# Patient Record
Sex: MALE | HISPANIC OR LATINO
[De-identification: names, ages, dates, MRNs, and addresses within clinical notes are randomized per-mention and may not be internally consistent; named-entity substitution may affect disease eponyms.]

---

## 2020-08-24 PROBLEM — Z00.00 ENCOUNTER FOR PREVENTIVE HEALTH EXAMINATION: Status: ACTIVE | Noted: 2020-08-24

## 2020-08-26 ENCOUNTER — APPOINTMENT (OUTPATIENT)
Dept: NEUROSURGERY | Facility: CLINIC | Age: 45
End: 2020-08-26
Payer: MEDICAID

## 2020-08-26 DIAGNOSIS — M54.5 LOW BACK PAIN: ICD-10-CM

## 2020-08-26 DIAGNOSIS — M48.061 SPINAL STENOSIS, LUMBAR REGION WITHOUT NEUROGENIC CLAUDICATION: ICD-10-CM

## 2020-08-26 DIAGNOSIS — I10 ESSENTIAL (PRIMARY) HYPERTENSION: ICD-10-CM

## 2020-08-26 DIAGNOSIS — E11.9 TYPE 2 DIABETES MELLITUS W/OUT COMPLICATIONS: ICD-10-CM

## 2020-08-26 PROCEDURE — 99213 OFFICE O/P EST LOW 20 MIN: CPT | Mod: 95

## 2020-08-26 RX ORDER — METFORMIN HYDROCHLORIDE 500 MG/1
500 TABLET, COATED ORAL
Refills: 0 | Status: ACTIVE | COMMUNITY

## 2020-08-26 RX ORDER — LOSARTAN POTASSIUM 50 MG/1
50 TABLET, FILM COATED ORAL
Refills: 0 | Status: ACTIVE | COMMUNITY

## 2020-08-26 RX ORDER — METOPROLOL TARTRATE 100 MG/1
100 TABLET, FILM COATED ORAL
Refills: 0 | Status: ACTIVE | COMMUNITY

## 2020-08-26 RX ORDER — OMEPRAZOLE 40 MG/1
40 CAPSULE, DELAYED RELEASE ORAL
Refills: 0 | Status: ACTIVE | COMMUNITY

## 2020-08-26 NOTE — HISTORY OF PRESENT ILLNESS
[de-identified] : 43 y/o male with hx of HTN and type II DM surghx L5-S1 (7/2018) presents today for telemedicine follow-up. 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 He states that he was then furlough as a results of the pandemic and develop back pain two months after being furlough. He followed up with pain management and was treated with medications with were gabapentin and flexeril and  oxycodone 5 mg  and he received one epidural  injection. The combination of pain treatment helped but states he is still taking the medication including the oxycodone three times a day. Currently he has lower back pain radiating to bilateral lower extremities in which he complains of soreness and aching. He denies any other neurological symptoms. [FreeTextEntry1] : low back pain

## 2020-08-26 NOTE — PLAN
[FreeTextEntry1] : 45 y/o male with hx of HTN and type II DM surg hx L5-S1 (7/2018) presents today for telemedicine follow-up of lower back pain. 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 He states he worked from November to April and then was furlough as a results of the pandemic. Two months after he was furlough he develop lower back pain radiating to bilateral lower extremities and was treated. The treatment included gabapentin, oxycodone, flexeril and one epidural injection. The treatment did help but he still requires oxycodone three times a day. Prior to his return back to work he was only on NSAIDs for pain controlled. At this time based on increase use of Narcoctics, failure of conservative management which include use of NSAIDs, PT and epidural injection and past surgical hx we recommend a new MRI of the lumbar spine and xrays to assess the surgical area. Once all testing is completed patient is to follow-up with the office. Plan of care and diagnostic testing was discussed in lenth and all questions and concerns has been addressed and answered. The patient provided verbal consent for a telemedicine consultation and I spent a total time of 15 minutes with the patient with more than half spent on counseling and coordinating care. \par \par \par

## 2020-08-26 NOTE — REASON FOR VISIT
[New Patient Visit] : a new patient visit [Referred By: _________] : Patient was referred by LESLIE [Home] : at home, [unfilled] , at the time of the visit. [Medical Office: ([facility])___] : at the medical office located in  [Verbal consent obtained from patient] : the patient, [unfilled] [FreeTextEntry1] : low back pain

## 2020-08-26 NOTE — REVIEW OF SYSTEMS
[Negative] : Heme/Lymph [Tingling] : no tingling [Numbness] : no numbness [Difficulty Walking] : no difficulty walking [Inability to Walk] : able to walk [FreeTextEntry9] : low back pain

## 2020-10-15 ENCOUNTER — APPOINTMENT (OUTPATIENT)
Dept: NEUROSURGERY | Facility: CLINIC | Age: 45
End: 2020-10-15

## 2022-07-07 ENCOUNTER — APPOINTMENT (OUTPATIENT)
Dept: NEUROSURGERY | Facility: CLINIC | Age: 47
End: 2022-07-07

## 2022-07-08 NOTE — ASSESSMENT
[FreeTextEntry1] : IMPRESSION:\par We discussed that at that time based on increase use of Narcotics, failure of conservative management which include use of NSAIDs, PT and epidural injection and past surgical histoy we recommended a new MRI of the lumbar spine and x-rays to assess the surgical area. \par \par \par \par PLAN:\par \par

## 2022-07-08 NOTE — HISTORY OF PRESENT ILLNESS
[FreeTextEntry1] : 45 y/o male with PMH of HTN and type II DM, surgical  hx L5-S1 (7/2018). Last visit via telemedicine on 8/26/2020 for follow-up of lower back pain. 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 Per patient, he worked from November to April and then was furlough as a results of the pandemic. Two months after he was furlough he develop lower back pain radiating to bilateral lower extremities and was treated. The treatment included Gabapentin, Oxycodone, Flexeril and one epidural injection. The treatment did help but he was still requiring oxycodone three times a day. Prior to his return back to work he was only on NSAIDs for pain controlled. \par \par We discussed that at that time based on increase use of Narcoctics, failure of conservative management which include use of NSAIDs, PT and epidural injection and past surgical hx we recommended a new MRI of the lumbar spine and x-rays to assess the surgical area. \par

## 2022-08-04 ENCOUNTER — APPOINTMENT (OUTPATIENT)
Dept: NEUROSURGERY | Facility: CLINIC | Age: 47
End: 2022-08-04

## 2024-07-08 DIAGNOSIS — Z87.891 PERSONAL HISTORY OF NICOTINE DEPENDENCE: ICD-10-CM

## 2024-07-08 DIAGNOSIS — Z78.9 OTHER SPECIFIED HEALTH STATUS: ICD-10-CM

## 2024-07-08 PROBLEM — M54.50 LOW BACK PAIN: Status: ACTIVE | Noted: 2020-08-26

## 2024-07-09 ENCOUNTER — APPOINTMENT (OUTPATIENT)
Dept: NEUROSURGERY | Facility: CLINIC | Age: 49
End: 2024-07-09
Payer: COMMERCIAL

## 2024-07-09 VITALS
HEIGHT: 68 IN | OXYGEN SATURATION: 96 % | WEIGHT: 200 LBS | TEMPERATURE: 97.2 F | DIASTOLIC BLOOD PRESSURE: 79 MMHG | BODY MASS INDEX: 30.31 KG/M2 | HEART RATE: 76 BPM | SYSTOLIC BLOOD PRESSURE: 121 MMHG

## 2024-07-09 DIAGNOSIS — M48.061 SPINAL STENOSIS, LUMBAR REGION WITHOUT NEUROGENIC CLAUDICATION: ICD-10-CM

## 2024-07-09 DIAGNOSIS — M54.50 LOW BACK PAIN, UNSPECIFIED: ICD-10-CM

## 2024-07-09 PROCEDURE — 99205 OFFICE O/P NEW HI 60 MIN: CPT

## 2024-07-09 RX ORDER — PREGABALIN 300 MG/1
300 CAPSULE ORAL
Refills: 0 | Status: ACTIVE | COMMUNITY

## 2024-07-09 RX ORDER — TIRZEPATIDE 2.5 MG/.5ML
2.5 INJECTION, SOLUTION SUBCUTANEOUS
Refills: 0 | Status: ACTIVE | COMMUNITY

## 2024-07-09 RX ORDER — HYDROCODONE BITARTRATE AND ACETAMINOPHEN 5; 325 MG/1; MG/1
5-325 TABLET ORAL
Refills: 0 | Status: ACTIVE | COMMUNITY

## 2024-07-09 RX ORDER — PANTOPRAZOLE 40 MG/1
40 TABLET, DELAYED RELEASE ORAL
Refills: 0 | Status: ACTIVE | COMMUNITY

## 2024-07-09 RX ORDER — ATORVASTATIN CALCIUM 20 MG/1
20 TABLET, FILM COATED ORAL
Refills: 0 | Status: ACTIVE | COMMUNITY

## 2024-07-09 RX ORDER — LOSARTAN POTASSIUM 100 MG/1
100 TABLET, FILM COATED ORAL
Refills: 0 | Status: ACTIVE | COMMUNITY

## 2024-07-09 RX ORDER — EMPAGLIFLOZIN AND METFORMIN HYDROCHLORIDE 12.5; 1 MG/1; MG/1
TABLET ORAL
Refills: 0 | Status: ACTIVE | COMMUNITY

## 2024-07-09 RX ORDER — CYCLOBENZAPRINE HYDROCHLORIDE 7.5 MG/1
TABLET, FILM COATED ORAL
Refills: 0 | Status: ACTIVE | COMMUNITY

## 2024-07-15 ENCOUNTER — NON-APPOINTMENT (OUTPATIENT)
Age: 49
End: 2024-07-15

## 2024-07-17 ENCOUNTER — APPOINTMENT (OUTPATIENT)
Dept: CT IMAGING | Facility: CLINIC | Age: 49
End: 2024-07-17

## 2024-07-17 PROCEDURE — 72131 CT LUMBAR SPINE W/O DYE: CPT

## 2024-08-02 ENCOUNTER — APPOINTMENT (OUTPATIENT)
Dept: RADIOLOGY | Facility: CLINIC | Age: 49
End: 2024-08-02
Payer: COMMERCIAL

## 2024-08-02 PROCEDURE — 72110 X-RAY EXAM L-2 SPINE 4/>VWS: CPT

## 2024-08-06 ENCOUNTER — NON-APPOINTMENT (OUTPATIENT)
Age: 49
End: 2024-08-06

## 2024-08-09 ENCOUNTER — OUTPATIENT (OUTPATIENT)
Dept: OUTPATIENT SERVICES | Facility: HOSPITAL | Age: 49
LOS: 1 days | End: 2024-08-09
Payer: COMMERCIAL

## 2024-08-09 VITALS
HEART RATE: 72 BPM | OXYGEN SATURATION: 98 % | SYSTOLIC BLOOD PRESSURE: 130 MMHG | RESPIRATION RATE: 16 BRPM | DIASTOLIC BLOOD PRESSURE: 83 MMHG | TEMPERATURE: 98 F | WEIGHT: 199.96 LBS | HEIGHT: 66 IN

## 2024-08-09 DIAGNOSIS — E11.9 TYPE 2 DIABETES MELLITUS WITHOUT COMPLICATIONS: ICD-10-CM

## 2024-08-09 DIAGNOSIS — M48.061 SPINAL STENOSIS, LUMBAR REGION WITHOUT NEUROGENIC CLAUDICATION: ICD-10-CM

## 2024-08-09 DIAGNOSIS — I10 ESSENTIAL (PRIMARY) HYPERTENSION: ICD-10-CM

## 2024-08-09 DIAGNOSIS — Z01.818 ENCOUNTER FOR OTHER PREPROCEDURAL EXAMINATION: ICD-10-CM

## 2024-08-09 DIAGNOSIS — M54.50 LOW BACK PAIN, UNSPECIFIED: ICD-10-CM

## 2024-08-09 DIAGNOSIS — Z98.1 ARTHRODESIS STATUS: Chronic | ICD-10-CM

## 2024-08-09 DIAGNOSIS — Z98.890 OTHER SPECIFIED POSTPROCEDURAL STATES: Chronic | ICD-10-CM

## 2024-08-09 LAB
A1C WITH ESTIMATED AVERAGE GLUCOSE RESULT: 6.8 % — HIGH (ref 4–5.6)
BLD GP AB SCN SERPL QL: SIGNIFICANT CHANGE UP
ESTIMATED AVERAGE GLUCOSE: 148 MG/DL — HIGH (ref 68–114)
MRSA PCR RESULT.: SIGNIFICANT CHANGE UP
S AUREUS DNA NOSE QL NAA+PROBE: SIGNIFICANT CHANGE UP

## 2024-08-09 NOTE — H&P PST ADULT - NSICDXPASTSURGICALHX_GEN_ALL_CORE_FT
PAST SURGICAL HISTORY:  History of lumbar surgery      PAST SURGICAL HISTORY:  H/O spinal fusion     History of lumbar surgery

## 2024-08-09 NOTE — H&P PST ADULT - ATTENDING COMMENTS
No change in neurologic exam. Right EHL weakness. We will proceed as planned. L4-5 Lumbar microdiscectomy Right.

## 2024-08-09 NOTE — H&P PST ADULT - NSICDXPASTMEDICALHX_GEN_ALL_CORE_FT
PAST MEDICAL HISTORY:  DM (diabetes mellitus)     GERD (gastroesophageal reflux disease)     HLD (hyperlipidemia)     HTN (hypertension)      PAST MEDICAL HISTORY:  DM (diabetes mellitus)     GERD (gastroesophageal reflux disease)     H/O spinal stenosis     HLD (hyperlipidemia)     HTN (hypertension)

## 2024-08-09 NOTE — H&P PST ADULT - PROBLEM SELECTOR PLAN 3
Pt instructed to stop Synjardy 3 days prior to surgery  Pt also instructed to stop Mounjaro at least 7 days prior to surgery- Per pt his last dose 8/5/24   Monitor glucose closely perioperatively

## 2024-08-09 NOTE — H&P PST ADULT - HISTORY OF PRESENT ILLNESS
48 y.o male with PMHx for HLD, HTN, GERD, DM, Lumbar fusion L5-S1 2018, now with c/o of worsening Low back pain. On w/u found to have Spinal Stenosis Lumbar region and now for schedule Right L4-L5 Lumbar Microdiscectomy on 8/21/24 with Dr Gan  48 y.o male with PMHx for HLD, HTN, GERD, DM, Spinal Stenosis s/p Lumbar fusion L5-S1 2018, now with c/o of worsening Low back pain. On w/u found to have Spinal Stenosis Lumbar region and now for schedule Right L4-L5 Lumbar Microdiscectomy on 8/21/24 with Dr Gan

## 2024-08-09 NOTE — H&P PST ADULT - NSICDXPROCEDURE_GEN_ALL_CORE_FT
PROCEDURES:  Laminotomy of lumbar vertebra with nerve root decompression 09-Aug-2024 09:27:35  Bee Turcios

## 2024-08-09 NOTE — H&P PST ADULT - ASSESSMENT
48 y.o male with c/o of worsening Low back pain. On w/u found to have Spinal Stenosis Lumbar region and now for schedule Right L4-L5 Lumbar Microdiscectomy on 8/21/24 with Dr Gan      48 y.o male with  Spinal Stenosis Lumbar region and now for schedule Right L4-L5 Lumbar Microdiscectomy on 8/21/24 with Dr Elvia CABALLERO 3

## 2024-08-09 NOTE — H&P PST ADULT - PROBLEM SELECTOR PLAN 1
Pt schedule for     Labs drawn by PCP - will f/u result  Pt was seen by PCP for Medical clearance - will f/u report    Pt was  instructed to stop aspirin/ecotrin and all over the counter medication including vitamins and herbal supplements one week prior to surgery   Instructions given on the use of 4% chlorhexidine wash and Pt verbalized understanding of same   Pt Instructed to have nothing by mouth starting midnight day before surgery  Patient is to expect a phone call day before surgery between the hours of 430- 630pm giving arrival time for surgery   Written and verbal preoperative instructions given to patient with understanding verbalized.     Patient today with STOP bang score 3  Low  risk for PADMINI Pt schedule for Right L4-L5 Lumbar Microdiscectomy on 8/21/24 with Dr Gan     Labs drawn by PCP - will f/u result  Pt was seen by PCP for Medical clearance - will f/u report    pt was swabbed for MRSA/MSSA -will f/u with result   Pt Instructed on use of Mupirocin if nasal swab positive      Pt was  instructed to stop aspirin/ecotrin and all over the counter medication including vitamins and herbal supplements one week prior to surgery   Instructions given on the use of 4% chlorhexidine wash and Pt verbalized understanding of same   Pt Instructed to have nothing by mouth starting midnight day before surgery  Patient is to expect a phone call day before surgery between the hours of 430- 630pm giving arrival time for surgery   Written and verbal preoperative instructions given to patient with understanding verbalized.     Patient today with STOP bang score 3  Low  risk for PADMINI

## 2024-08-12 PROCEDURE — 86901 BLOOD TYPING SEROLOGIC RH(D): CPT

## 2024-08-12 PROCEDURE — 86900 BLOOD TYPING SEROLOGIC ABO: CPT

## 2024-08-12 PROCEDURE — 87640 STAPH A DNA AMP PROBE: CPT

## 2024-08-12 PROCEDURE — 86850 RBC ANTIBODY SCREEN: CPT

## 2024-08-12 PROCEDURE — 36415 COLL VENOUS BLD VENIPUNCTURE: CPT

## 2024-08-12 PROCEDURE — G0463: CPT

## 2024-08-12 PROCEDURE — 83036 HEMOGLOBIN GLYCOSYLATED A1C: CPT

## 2024-08-12 PROCEDURE — 87641 MR-STAPH DNA AMP PROBE: CPT

## 2024-08-21 ENCOUNTER — OUTPATIENT (OUTPATIENT)
Dept: OUTPATIENT SERVICES | Facility: HOSPITAL | Age: 49
LOS: 1 days | End: 2024-08-21
Payer: COMMERCIAL

## 2024-08-21 ENCOUNTER — TRANSCRIPTION ENCOUNTER (OUTPATIENT)
Age: 49
End: 2024-08-21

## 2024-08-21 ENCOUNTER — APPOINTMENT (OUTPATIENT)
Dept: NEUROSURGERY | Facility: HOSPITAL | Age: 49
End: 2024-08-21

## 2024-08-21 VITALS
SYSTOLIC BLOOD PRESSURE: 139 MMHG | DIASTOLIC BLOOD PRESSURE: 83 MMHG | HEART RATE: 73 BPM | TEMPERATURE: 98 F | WEIGHT: 199.96 LBS | OXYGEN SATURATION: 99 % | HEIGHT: 66 IN | RESPIRATION RATE: 16 BRPM

## 2024-08-21 VITALS — DIASTOLIC BLOOD PRESSURE: 67 MMHG | HEART RATE: 94 BPM | OXYGEN SATURATION: 95 % | SYSTOLIC BLOOD PRESSURE: 120 MMHG

## 2024-08-21 DIAGNOSIS — M48.061 SPINAL STENOSIS, LUMBAR REGION WITHOUT NEUROGENIC CLAUDICATION: ICD-10-CM

## 2024-08-21 DIAGNOSIS — M54.50 LOW BACK PAIN, UNSPECIFIED: ICD-10-CM

## 2024-08-21 DIAGNOSIS — Z98.890 OTHER SPECIFIED POSTPROCEDURAL STATES: Chronic | ICD-10-CM

## 2024-08-21 DIAGNOSIS — Z98.1 ARTHRODESIS STATUS: Chronic | ICD-10-CM

## 2024-08-21 PROBLEM — Z87.39 PERSONAL HISTORY OF OTHER DISEASES OF THE MUSCULOSKELETAL SYSTEM AND CONNECTIVE TISSUE: Chronic | Status: ACTIVE | Noted: 2024-08-09

## 2024-08-21 PROBLEM — K21.9 GASTRO-ESOPHAGEAL REFLUX DISEASE WITHOUT ESOPHAGITIS: Chronic | Status: ACTIVE | Noted: 2024-08-09

## 2024-08-21 PROBLEM — E11.9 TYPE 2 DIABETES MELLITUS WITHOUT COMPLICATIONS: Chronic | Status: ACTIVE | Noted: 2024-08-09

## 2024-08-21 PROBLEM — I10 ESSENTIAL (PRIMARY) HYPERTENSION: Chronic | Status: ACTIVE | Noted: 2024-08-09

## 2024-08-21 PROBLEM — E78.5 HYPERLIPIDEMIA, UNSPECIFIED: Chronic | Status: ACTIVE | Noted: 2024-08-09

## 2024-08-21 LAB
BLD GP AB SCN SERPL QL: SIGNIFICANT CHANGE UP
GLUCOSE BLDC GLUCOMTR-MCNC: 190 MG/DL — HIGH (ref 70–99)
GLUCOSE BLDC GLUCOMTR-MCNC: 239 MG/DL — HIGH (ref 70–99)

## 2024-08-21 PROCEDURE — 76000 FLUOROSCOPY <1 HR PHYS/QHP: CPT

## 2024-08-21 PROCEDURE — 97162 PT EVAL MOD COMPLEX 30 MIN: CPT

## 2024-08-21 PROCEDURE — 86850 RBC ANTIBODY SCREEN: CPT

## 2024-08-21 PROCEDURE — 62380 NDSC DCMPRN 1 NTRSPC LUMBAR: CPT | Mod: AS

## 2024-08-21 PROCEDURE — 62380 NDSC DCMPRN 1 NTRSPC LUMBAR: CPT

## 2024-08-21 PROCEDURE — 86900 BLOOD TYPING SEROLOGIC ABO: CPT

## 2024-08-21 PROCEDURE — 82962 GLUCOSE BLOOD TEST: CPT

## 2024-08-21 PROCEDURE — 36415 COLL VENOUS BLD VENIPUNCTURE: CPT

## 2024-08-21 PROCEDURE — 86901 BLOOD TYPING SEROLOGIC RH(D): CPT

## 2024-08-21 PROCEDURE — 62380 NDSC DCMPRN 1 NTRSPC LUMBAR: CPT | Mod: RT

## 2024-08-21 DEVICE — SURGICEL 2 X 14": Type: IMPLANTABLE DEVICE | Status: FUNCTIONAL

## 2024-08-21 DEVICE — FLOSEAL WITH RECOTHROM THROMBIN 5ML: Type: IMPLANTABLE DEVICE | Status: FUNCTIONAL

## 2024-08-21 DEVICE — SURGIFOAM PAD 8CM X 12.5CM X 2MM (100C): Type: IMPLANTABLE DEVICE | Status: FUNCTIONAL

## 2024-08-21 DEVICE — BONE WAX 2.5GM: Type: IMPLANTABLE DEVICE | Status: FUNCTIONAL

## 2024-08-21 RX ORDER — CELECOXIB 400 MG/1
200 CAPSULE ORAL ONCE
Refills: 0 | Status: COMPLETED | OUTPATIENT
Start: 2024-08-21 | End: 2024-08-21

## 2024-08-21 RX ORDER — ATORVASTATIN CALCIUM 40 MG/1
1 TABLET, FILM COATED ORAL
Qty: 0 | Refills: 0 | DISCHARGE

## 2024-08-21 RX ORDER — SODIUM CHLORIDE 9 MG/ML
3 INJECTION INTRAMUSCULAR; INTRAVENOUS; SUBCUTANEOUS EVERY 8 HOURS
Refills: 0 | Status: DISCONTINUED | OUTPATIENT
Start: 2024-08-21 | End: 2024-08-21

## 2024-08-21 RX ORDER — HYDROMORPHONE HYDROCHLORIDE 2 MG/1
0.5 TABLET ORAL
Refills: 0 | Status: DISCONTINUED | OUTPATIENT
Start: 2024-08-21 | End: 2024-08-21

## 2024-08-21 RX ORDER — OXYCODONE HYDROCHLORIDE 5 MG/1
2.5 TABLET ORAL EVERY 4 HOURS
Refills: 0 | Status: DISCONTINUED | OUTPATIENT
Start: 2024-08-21 | End: 2024-08-21

## 2024-08-21 RX ORDER — ONDANSETRON 2 MG/ML
4 INJECTION, SOLUTION INTRAMUSCULAR; INTRAVENOUS EVERY 6 HOURS
Refills: 0 | Status: DISCONTINUED | OUTPATIENT
Start: 2024-08-21 | End: 2024-09-04

## 2024-08-21 RX ORDER — CYCLOBENZAPRINE HCL 10 MG
2 TABLET ORAL
Refills: 0 | DISCHARGE

## 2024-08-21 RX ORDER — METOPROLOL TARTRATE 100 MG
1 TABLET ORAL
Qty: 0 | Refills: 0 | DISCHARGE

## 2024-08-21 RX ORDER — EMPAGLIFLOZIN, METFORMIN HYDROCHLORIDE 5; 1000 MG/1; MG/1
1 TABLET, EXTENDED RELEASE ORAL
Qty: 0 | Refills: 0 | DISCHARGE

## 2024-08-21 RX ORDER — PANTOPRAZOLE SODIUM 20 MG/1
1 TABLET, DELAYED RELEASE ORAL
Qty: 0 | Refills: 0 | DISCHARGE

## 2024-08-21 RX ORDER — OXYCODONE AND ACETAMINOPHEN 10; 325 MG/1; MG/1
1 TABLET ORAL
Refills: 0 | DISCHARGE

## 2024-08-21 RX ORDER — METHYLPREDNISOLONE 4 MG
1 TABLET ORAL
Qty: 1 | Refills: 0
Start: 2024-08-21 | End: 2024-08-26

## 2024-08-21 RX ORDER — CYCLOBENZAPRINE HCL 10 MG
1 TABLET ORAL
Qty: 30 | Refills: 0
Start: 2024-08-21 | End: 2024-08-30

## 2024-08-21 RX ORDER — FENTANYL CITRATE 50 UG/ML
25 INJECTION INTRAMUSCULAR; INTRAVENOUS
Refills: 0 | Status: DISCONTINUED | OUTPATIENT
Start: 2024-08-21 | End: 2024-08-21

## 2024-08-21 RX ORDER — OXYCODONE HYDROCHLORIDE 5 MG/1
5 TABLET ORAL EVERY 6 HOURS
Refills: 0 | Status: DISCONTINUED | OUTPATIENT
Start: 2024-08-21 | End: 2024-08-21

## 2024-08-21 RX ORDER — TIRZEPATIDE 2.5 MG/.5ML
2.5 INJECTION, SOLUTION SUBCUTANEOUS
Qty: 0 | Refills: 0 | DISCHARGE

## 2024-08-21 RX ORDER — ACETAMINOPHEN 325 MG/1
975 TABLET ORAL ONCE
Refills: 0 | Status: COMPLETED | OUTPATIENT
Start: 2024-08-21 | End: 2024-08-21

## 2024-08-21 RX ORDER — CHLORHEXIDINE GLUCONATE 40 MG/ML
1 SOLUTION TOPICAL ONCE
Refills: 0 | Status: COMPLETED | OUTPATIENT
Start: 2024-08-21 | End: 2024-08-21

## 2024-08-21 RX ORDER — LOSARTAN POTASSIUM 50 MG/1
1 TABLET, FILM COATED ORAL
Qty: 0 | Refills: 0 | DISCHARGE

## 2024-08-21 RX ORDER — OXYCODONE AND ACETAMINOPHEN 7.5; 325 MG/1; MG/1
1 TABLET ORAL
Qty: 20 | Refills: 0
Start: 2024-08-21 | End: 2024-08-25

## 2024-08-21 RX ORDER — DOCUSATE CALCIUM 240 MG/1
1 CAPSULE ORAL
Qty: 14 | Refills: 0
Start: 2024-08-21 | End: 2024-08-27

## 2024-08-21 RX ORDER — ACETAMINOPHEN 325 MG/1
650 TABLET ORAL EVERY 6 HOURS
Refills: 0 | Status: DISCONTINUED | OUTPATIENT
Start: 2024-08-21 | End: 2024-09-04

## 2024-08-21 RX ORDER — ONDANSETRON 2 MG/ML
4 INJECTION, SOLUTION INTRAMUSCULAR; INTRAVENOUS ONCE
Refills: 0 | Status: DISCONTINUED | OUTPATIENT
Start: 2024-08-21 | End: 2024-08-21

## 2024-08-21 RX ORDER — ACETAMINOPHEN 325 MG/1
1000 TABLET ORAL ONCE
Refills: 0 | Status: DISCONTINUED | OUTPATIENT
Start: 2024-08-21 | End: 2024-08-21

## 2024-08-21 RX ORDER — TRAMADOL HYDROCHLORIDE 200 MG/1
50 TABLET, EXTENDED RELEASE ORAL ONCE
Refills: 0 | Status: DISCONTINUED | OUTPATIENT
Start: 2024-08-21 | End: 2024-08-21

## 2024-08-21 RX ADMIN — HYDROMORPHONE HYDROCHLORIDE 0.5 MILLIGRAM(S): 2 TABLET ORAL at 12:11

## 2024-08-21 RX ADMIN — HYDROMORPHONE HYDROCHLORIDE 0.5 MILLIGRAM(S): 2 TABLET ORAL at 11:56

## 2024-08-21 RX ADMIN — ACETAMINOPHEN 975 MILLIGRAM(S): 325 TABLET ORAL at 07:19

## 2024-08-21 RX ADMIN — HYDROMORPHONE HYDROCHLORIDE 0.5 MILLIGRAM(S): 2 TABLET ORAL at 11:20

## 2024-08-21 RX ADMIN — SODIUM CHLORIDE 3 MILLILITER(S): 9 INJECTION INTRAMUSCULAR; INTRAVENOUS; SUBCUTANEOUS at 07:12

## 2024-08-21 RX ADMIN — TRAMADOL HYDROCHLORIDE 50 MILLIGRAM(S): 200 TABLET, EXTENDED RELEASE ORAL at 07:18

## 2024-08-21 RX ADMIN — HYDROMORPHONE HYDROCHLORIDE 0.5 MILLIGRAM(S): 2 TABLET ORAL at 11:47

## 2024-08-21 RX ADMIN — CELECOXIB 200 MILLIGRAM(S): 400 CAPSULE ORAL at 07:18

## 2024-08-21 RX ADMIN — CHLORHEXIDINE GLUCONATE 1 APPLICATION(S): 40 SOLUTION TOPICAL at 07:18

## 2024-08-21 NOTE — ASU PATIENT PROFILE, ADULT - NSICDXPASTMEDICALHX_GEN_ALL_CORE_FT
PAST MEDICAL HISTORY:  DM (diabetes mellitus)     GERD (gastroesophageal reflux disease)     H/O spinal stenosis     HLD (hyperlipidemia)     HTN (hypertension)

## 2024-08-21 NOTE — ASU PREOP CHECKLIST - ADDITIONAL CONSENTS
Patient called because Luis Angel did not receive precription for Trazadone, sleep aid that Neisha told her was going to be called in.   vendor

## 2024-08-21 NOTE — ASU DISCHARGE PLAN (ADULT/PEDIATRIC) - CALL YOUR DOCTOR IF YOU HAVE ANY OF THE FOLLOWING:
Fever greater than (need to indicate Fahrenheit or Celsius) Bleeding that does not stop/Swelling that gets worse/Pain not relieved by Medications/Fever greater than (need to indicate Fahrenheit or Celsius)/Nausea and vomiting that does not stop/Unable to urinate/Increased irritability or sluggishness

## 2024-08-21 NOTE — ASU DISCHARGE PLAN (ADULT/PEDIATRIC) - CARE PROVIDER_API CALL
Yuniel Gan  Neurosurgery  [address]  [place] [address]  Phone: [phone]  Fax: [phone]  Follow Up Time: 1 week

## 2024-08-21 NOTE — BRIEF OPERATIVE NOTE - NSICDXBRIEFPREOP_GEN_ALL_CORE_FT
PRE-OP DIAGNOSIS:  Spinal stenosis, lumbar region without neurogenic claudication 21-Aug-2024 11:40:32  Kraig Pinon

## 2024-08-21 NOTE — ASU DISCHARGE PLAN (ADULT/PEDIATRIC) - ASU DC SPECIAL INSTRUCTIONSFT
Keep Dressing clean, dry, and intact   Weight Bearing as Tolerated to bilateral lower extremities.  Follow up with Dr Yuniel Gan, Neurosurgery in 1 week at [phone]     If patient has drainage from the wound, please contact the surgeon's office.   If patient has intractable pain, please contact the surgeon's office.   If excessively warm at the incision site is noted, please contact the surgeon's office.   If redness is noted, especially spreading, please contact the surgeon's office.     If patient has fever over 101F please return to the hospital through the Emergency Room.   If monique blood is saturating the dressing, please return to the hospital through the Emergency Room.  If drainage of fluid or pus is noted, please return to the hospital through the Emergency Room.

## 2024-08-21 NOTE — BRIEF OPERATIVE NOTE - NSICDXBRIEFPOSTOP_GEN_ALL_CORE_FT
POST-OP DIAGNOSIS:  Spinal stenosis, lumbar region without neurogenic claudication 21-Aug-2024 11:40:38  Kraig Pinon

## 2024-08-21 NOTE — ASU PATIENT PROFILE, ADULT - TEACHING/LEARNING DEVELOPMENTAL CONSIDERATIONS
none [Chaperone Present] : A chaperone was present in the examining room during all aspects of the physical examination [Appropriately responsive] : appropriately responsive [Alert] : alert [No Acute Distress] : no acute distress [No Lymphadenopathy] : no lymphadenopathy [Regular Rate Rhythm] : regular rate rhythm [No Murmurs] : no murmurs [Clear to Auscultation B/L] : clear to auscultation bilaterally [Soft] : soft [Non-tender] : non-tender [Non-distended] : non-distended [No HSM] : No HSM [No Lesions] : no lesions [No Mass] : no mass [Oriented x3] : oriented x3 [Examination Of The Breasts] : a normal appearance [No Masses] : no breast masses were palpable [Labia Majora] : normal [Labia Minora] : normal [Normal] : normal [Uterine Adnexae] : normal [FreeTextEntry9] : Guaiac negative. No masses noted.

## 2024-08-21 NOTE — PHYSICAL THERAPY INITIAL EVALUATION ADULT - GENERAL OBSERVATIONS, REHAB EVAL
Pt. found lying supine in NAD. Pt. is alert and oriented x 3; cooperative and motivated during eval. Low back dressing soaked with blood and RN was notified. Ortho PA notified by RN.

## 2024-08-27 RX ORDER — OXYCODONE 5 MG/1
5 TABLET ORAL
Qty: 12 | Refills: 0 | Status: COMPLETED | COMMUNITY
Start: 2024-08-27 | End: 2024-08-27

## 2024-08-28 RX ORDER — OXYCODONE 5 MG/1
5 TABLET ORAL
Qty: 12 | Refills: 0 | Status: ACTIVE | COMMUNITY
Start: 2024-08-28 | End: 1900-01-01

## 2024-08-28 RX ORDER — OXYCODONE 5 MG/1
5 TABLET ORAL
Qty: 12 | Refills: 0 | Status: COMPLETED | COMMUNITY
Start: 2024-08-27 | End: 2024-08-28

## 2024-08-31 ENCOUNTER — NON-APPOINTMENT (OUTPATIENT)
Age: 49
End: 2024-08-31

## 2024-09-04 PROBLEM — Z98.890 STATUS POST LUMBAR MICRODISCECTOMY: Status: ACTIVE | Noted: 2024-09-04

## 2024-09-05 ENCOUNTER — APPOINTMENT (OUTPATIENT)
Dept: NEUROSURGERY | Facility: CLINIC | Age: 49
End: 2024-09-05
Payer: COMMERCIAL

## 2024-09-05 VITALS
BODY MASS INDEX: 30.31 KG/M2 | DIASTOLIC BLOOD PRESSURE: 84 MMHG | OXYGEN SATURATION: 100 % | TEMPERATURE: 98.2 F | WEIGHT: 200 LBS | HEART RATE: 86 BPM | HEIGHT: 68 IN | SYSTOLIC BLOOD PRESSURE: 138 MMHG

## 2024-09-05 DIAGNOSIS — M48.061 SPINAL STENOSIS, LUMBAR REGION WITHOUT NEUROGENIC CLAUDICATION: ICD-10-CM

## 2024-09-05 DIAGNOSIS — Z98.890 OTHER SPECIFIED POSTPROCEDURAL STATES: ICD-10-CM

## 2024-09-05 PROCEDURE — 99024 POSTOP FOLLOW-UP VISIT: CPT

## 2024-09-05 NOTE — HISTORY OF PRESENT ILLNESS
[FreeTextEntry1] : low back pain [de-identified] : 9/3/24: 48-year-old male with PMH of HTN and type II DM surgical history L5-S1 fusion (7/2018). R sided lower back pain radiating to right buttock into RLE, numbness right leg. His MRI showed newly herniated disk at L4-5 impinging on the nerve root on the right side with weakness of the EHL.  In view of the fact that the patient had failed conservative therapy, an operation was offered. Patient is s/p L4-5 lumbar endoscopic microdiscectomy on 8/21/24. Overall, his leg pain is a lot better. He reports lower back pain. Takes Oxycodone and cyclobenzaprine as needed. Denies bladder or bowel dysfunction.   7/9/24: 48-year-old male with PMH of HTN and type II DM surgical history L5-S1 fusion (7/2018). Last visit 08/2020. Patient here for follow up because of R sided lower back pain radiating to right buttock into RLE, numbness right leg. He was doing fine after surgery until 5/28/24 where he started to have severe back pain and right leg pain. He has been following with pain management s/p EPSI and RFA without long lasting relief. His pain is constant and affects quality of his life.   8/26/20: 45 y/o male with hx of HTN and type II DM surghx L5-S1 (7/2018) presents today for telemedicine follow-up. 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 He states that he was then furlough as a results of the pandemic and develop back pain two months after being furlough. He followed up with pain management and was treated with medications with were gabapentin and flexeril and  oxycodone 5 mg  and he received one epidural  injection. The combination of pain treatment helped but states he is still taking the medication including the oxycodone three times a day. Currently he has lower back pain radiating to bilateral lower extremities in which he complains of soreness and aching. He denies any other neurological symptoms.

## 2024-09-05 NOTE — RESULTS/DATA
[FreeTextEntry1] : MRI lumbar spine 6/24/24: s/p L5-S1 fusion,  L4-5 increasing large disc herniation encroaching on the right neural foramina deforming the thecal sac and impinging on the lateral recess and L5 nerve root.

## 2024-09-05 NOTE — PHYSICAL EXAM
[General Appearance - Alert] : alert [Oriented To Time, Place, And Person] : oriented to person, place, and time [Cranial Nerves Optic (II)] : visual acuity intact bilaterally,  pupils equal round and reactive to light [Cranial Nerves Facial (VII)] : face symmetrical [Cranial Nerves Oculomotor (III)] : extraocular motion intact [Cranial Nerves Vestibulocochlear (VIII)] : hearing was intact bilaterally [Cranial Nerves Accessory (XI - Cranial And Spinal)] : head turning and shoulder shrug symmetric [Cranial Nerves Hypoglossal (XII)] : there was no tongue deviation with protrusion [Motor Tone] : muscle tone was normal in all four extremities [Motor Strength] : muscle strength was normal in all four extremities [Balance] : balance was intact [2+] : Ankle jerk left 2+ [No Visual Abnormalities] : no visible abnormailities [Straight-Leg Raise Test - Right] : straight leg raise of the right leg was positive [Pain / Temp Decrease Lateral Leg & Dorsum Of Foot Right Only] : L5 sensory impairment [4] : 4/5 Ankle Dorsiflexor (L4) [3] : 3/5 Great Toe Extension (L5) [1] : 1/4 Knee Jerk Reflex [5] : 5/5 Ankle Plantar Flexion (S1) [2] : 2/4 Ankle Jerk Reflex [Sclera] : the sclera and conjunctiva were normal [PERRL With Normal Accommodation] : pupils were equal in size, round, reactive to light, with normal accommodation [Extraocular Movements] : extraocular movements were intact [Outer Ear] : the ears and nose were normal in appearance [Hearing Threshold Finger Rub Not Barren] : hearing was normal [] : no respiratory distress [Exaggerated Use Of Accessory Muscles For Inspiration] : no accessory muscle use [Abnormal Walk] : normal gait [Skin Color & Pigmentation] : normal skin color and pigmentation [FreeTextEntry5] : significant R EHL weakness [FreeTextEntry6] : Numbness right leg L4-5 distribution.  [Straight-Leg Raise Test - Left] : straight leg raise of the left leg was negative

## 2024-09-05 NOTE — REVIEW OF SYSTEMS
[Negative] : Heme/Lymph [Numbness] : no numbness [Tingling] : no tingling [Difficulty Walking] : no difficulty walking [Inability to Walk] : able to walk [FreeTextEntry9] : low back pain

## 2024-09-05 NOTE — PHYSICAL EXAM
[General Appearance - Alert] : alert [Oriented To Time, Place, And Person] : oriented to person, place, and time [Cranial Nerves Optic (II)] : visual acuity intact bilaterally,  pupils equal round and reactive to light [Cranial Nerves Facial (VII)] : face symmetrical [Cranial Nerves Oculomotor (III)] : extraocular motion intact [Cranial Nerves Vestibulocochlear (VIII)] : hearing was intact bilaterally [Cranial Nerves Accessory (XI - Cranial And Spinal)] : head turning and shoulder shrug symmetric [Cranial Nerves Hypoglossal (XII)] : there was no tongue deviation with protrusion [Motor Tone] : muscle tone was normal in all four extremities [Motor Strength] : muscle strength was normal in all four extremities [Balance] : balance was intact [2+] : Ankle jerk left 2+ [No Visual Abnormalities] : no visible abnormailities [Straight-Leg Raise Test - Right] : straight leg raise of the right leg was positive [Pain / Temp Decrease Lateral Leg & Dorsum Of Foot Right Only] : L5 sensory impairment [4] : 4/5 Ankle Dorsiflexor (L4) [3] : 3/5 Great Toe Extension (L5) [1] : 1/4 Knee Jerk Reflex [5] : 5/5 Ankle Plantar Flexion (S1) [2] : 2/4 Ankle Jerk Reflex [Sclera] : the sclera and conjunctiva were normal [PERRL With Normal Accommodation] : pupils were equal in size, round, reactive to light, with normal accommodation [Extraocular Movements] : extraocular movements were intact [Outer Ear] : the ears and nose were normal in appearance [Hearing Threshold Finger Rub Not Pike] : hearing was normal [] : no respiratory distress [Exaggerated Use Of Accessory Muscles For Inspiration] : no accessory muscle use [Abnormal Walk] : normal gait [Skin Color & Pigmentation] : normal skin color and pigmentation [FreeTextEntry5] : significant R EHL weakness [FreeTextEntry6] : Numbness right leg L4-5 distribution.  [Straight-Leg Raise Test - Left] : straight leg raise of the left leg was negative

## 2024-09-05 NOTE — HISTORY OF PRESENT ILLNESS
[FreeTextEntry1] : low back pain [de-identified] : 9/3/24: 48-year-old male with PMH of HTN and type II DM surgical history L5-S1 fusion (7/2018). R sided lower back pain radiating to right buttock into RLE, numbness right leg. His MRI showed newly herniated disk at L4-5 impinging on the nerve root on the right side with weakness of the EHL.  In view of the fact that the patient had failed conservative therapy, an operation was offered. Patient is s/p L4-5 lumbar endoscopic microdiscectomy on 8/21/24. Overall, his leg pain is a lot better. He reports lower back pain. Takes Oxycodone and cyclobenzaprine as needed. Denies bladder or bowel dysfunction.   7/9/24: 48-year-old male with PMH of HTN and type II DM surgical history L5-S1 fusion (7/2018). Last visit 08/2020. Patient here for follow up because of R sided lower back pain radiating to right buttock into RLE, numbness right leg. He was doing fine after surgery until 5/28/24 where he started to have severe back pain and right leg pain. He has been following with pain management s/p EPSI and RFA without long lasting relief. His pain is constant and affects quality of his life.   8/26/20: 43 y/o male with hx of HTN and type II DM surghx L5-S1 (7/2018) presents today for telemedicine follow-up. 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 He states that he was then furlough as a results of the pandemic and develop back pain two months after being furlough. He followed up with pain management and was treated with medications with were gabapentin and flexeril and  oxycodone 5 mg  and he received one epidural  injection. The combination of pain treatment helped but states he is still taking the medication including the oxycodone three times a day. Currently he has lower back pain radiating to bilateral lower extremities in which he complains of soreness and aching. He denies any other neurological symptoms.

## 2024-09-05 NOTE — ASSESSMENT
[FreeTextEntry1] : 47 y/o M, history of prior lumbar surgery L5-S1 fusion, back pain radiating to RLE associated with numbness and tingling of the right leg since 05/2024. MRI L4-5 increasing large disc herniation encroaching on the right neural foramina deforming the thecal sac and impinging on the lateral recess and L5 nerve root. Patient is s/p L4-5 lumbar microdiscectomy on 8/21/24. His leg pain is much better. He does have some back pain that is severe at times.  Will start him with physical therapy for back massages gentle massages, heat therapy and lower extremity strengthening. I will also order a back support ( brace) as he needs it now due to the  back pain. At this point he is doing well but not able to return to work yet. We will re evaluate at the end of the month to see his progress.

## 2024-09-25 ENCOUNTER — NON-APPOINTMENT (OUTPATIENT)
Age: 49
End: 2024-09-25

## 2024-09-26 ENCOUNTER — APPOINTMENT (OUTPATIENT)
Dept: NEUROSURGERY | Facility: CLINIC | Age: 49
End: 2024-09-26

## 2024-09-26 PROCEDURE — 99024 POSTOP FOLLOW-UP VISIT: CPT

## 2024-10-06 NOTE — HISTORY OF PRESENT ILLNESS
[Home] : at home, [unfilled] , at the time of the visit. [Medical Office: ([facility])___] : at the medical office located in  [Verbal consent obtained from patient] : the patient, [unfilled] [FreeTextEntry1] : The patient is with history of hypertension, diabetes, had a spinal fusion in 2018.  The patient was doing well until early 2024 when he developed severe right lower extremity pain, numbness and tingling, and weakness of the foot.  He had physical therapy and one injection with no significant improvement.  His MRI showed newly herniated disk at L4-5 impinging on the nerve root on the right side with weakness He then underwent L4-5 transforaminal discectomy on 8/21/24. He is doing well. HIs pain is much better. He is currently doing PT. He has no bowel or bladder incontinence.

## 2024-10-06 NOTE — ASSESSMENT
[FreeTextEntry1] : 47 y/o M, history of prior lumbar surgery L5-S1 fusion, back pain radiating to RLE associated with numbness and tingling of the right leg since 05/2024. MRI L4-5 increasing large disc herniation encroaching on the right neural foramina deforming the thecal sac and impinging on the lateral recess and L5 nerve root. Patient is s/p L4-5 lumbar microdiscectomy on 8/21/24. His leg pain is much better. He still has to finish PT.

## 2024-10-06 NOTE — HISTORY OF PRESENT ILLNESS
[Home] : at home, [unfilled] , at the time of the visit. [Medical Office: ([facility])___] : at the medical office located in  [Verbal consent obtained from patient] : the patient, [unfilled] [FreeTextEntry1] : The patient is with history of hypertension, diabetes, had a spinal fusion in 2018.  The patient was doing well until early 2024 when he developed severe right lower extremity pain, numbness and tingling, and weakness of the foot.  He had physical therapy and one injection with no significant improvement.  His MRI showed newly herniated disk at L4-5 impinging on the nerve root on the right side with weakness He then underwent L4-5 transforaminal discectomy on 8/21/24. He is doing well. HIs pain is much better. He is currently doing PT. He has no bowel or bladder incontinence.

## 2024-10-10 ENCOUNTER — TRANSCRIPTION ENCOUNTER (OUTPATIENT)
Age: 49
End: 2024-10-10

## 2025-01-18 ENCOUNTER — NON-APPOINTMENT (OUTPATIENT)
Age: 50
End: 2025-01-18

## 2025-08-12 ENCOUNTER — NON-APPOINTMENT (OUTPATIENT)
Age: 50
End: 2025-08-12

## 2025-08-14 ENCOUNTER — APPOINTMENT (OUTPATIENT)
Dept: RADIOLOGY | Facility: CLINIC | Age: 50
End: 2025-08-14
Payer: COMMERCIAL

## 2025-08-14 ENCOUNTER — APPOINTMENT (OUTPATIENT)
Dept: NEUROSURGERY | Facility: CLINIC | Age: 50
End: 2025-08-14

## 2025-08-14 VITALS
DIASTOLIC BLOOD PRESSURE: 90 MMHG | SYSTOLIC BLOOD PRESSURE: 153 MMHG | HEART RATE: 89 BPM | BODY MASS INDEX: 30.31 KG/M2 | TEMPERATURE: 97.5 F | WEIGHT: 200 LBS | HEIGHT: 68 IN | OXYGEN SATURATION: 96 %

## 2025-08-14 PROCEDURE — 99214 OFFICE O/P EST MOD 30 MIN: CPT

## 2025-08-14 PROCEDURE — 72110 X-RAY EXAM L-2 SPINE 4/>VWS: CPT

## (undated) DEVICE — DRSG STERISTRIPS 0.5 X 4"

## (undated) DEVICE — DRAPE SURGICAL #1010

## (undated) DEVICE — PROBE BIOPOLAR QUAD MINI 32CM

## (undated) DEVICE — BUR NEW YORK SPINE DIAMOND 3.7MM

## (undated) DEVICE — FOLEY TRAY 16FR 5CC LF UMETER CLOSED

## (undated) DEVICE — DRAPE MICROSCOPE LEICA 26" X 150"

## (undated) DEVICE — SOL IRR POUR NS 0.9% 500ML

## (undated) DEVICE — APPLICATOR FOR FLOSEAL

## (undated) DEVICE — DRSG MASTISOL

## (undated) DEVICE — NDL SPINAL 90MM

## (undated) DEVICE — DISK TRAY ENDOSCOPIC

## (undated) DEVICE — SUT BIOSYN 4-0 18" P-12

## (undated) DEVICE — GOWN XL

## (undated) DEVICE — NDL SPINAL 18G X 3.5" (PINK)

## (undated) DEVICE — NDL SPINE 16FR 8"

## (undated) DEVICE — SUT POLYSORB 0 30" GU-46

## (undated) DEVICE — SOL IRR POUR H2O 500ML

## (undated) DEVICE — PACK LUMBAR LAMI

## (undated) DEVICE — MISONIX BONESCALPEL MIS BLUNT BLADE SHEATH & TUBESET 20MM

## (undated) DEVICE — MIDAS REX MR8 MATCH HEAD FLUTED SM BORE 3MM X 10CM

## (undated) DEVICE — SUT POLYSORB 3-0 18" V20 (POP-OFF)

## (undated) DEVICE — BASIN SET SINGLE

## (undated) DEVICE — BIPOLAR FORCEP HENSLER BAYONET 10" X 1MM (YELLOW)

## (undated) DEVICE — HANDLE TRIGGER FLEX 31MM

## (undated) DEVICE — FOR-ESU VALLEYLAB T7E14848DX: Type: DURABLE MEDICAL EQUIPMENT

## (undated) DEVICE — DRAPE C ARM BANDED SHOWER BAG

## (undated) DEVICE — DRAPE 1/2 SHEET 40X57"

## (undated) DEVICE — INSTRUMENT 2 LITE

## (undated) DEVICE — SUPPORT TUBE 320MM

## (undated) DEVICE — ELCTR BIPOLAR CORD GYRUS 12FT DISP

## (undated) DEVICE — DRAPE LAPAROTOMY W POUCHES

## (undated) DEVICE — MIDAS REX MR8 MATCH HEAD FLUTED LG BORE 3MM X 14CM

## (undated) DEVICE — SUPPORT TUBE 225MM

## (undated) DEVICE — DRAPE MAGNETIC INSTRUMENT MEDIUM

## (undated) DEVICE — STAPLER SKIN VISI-STAT 35 WIDE

## (undated) DEVICE — NDL SPINAL

## (undated) DEVICE — DRSG 2X2

## (undated) DEVICE — DRAPE C ARM UNIVERSAL

## (undated) DEVICE — DRAPE LIGHT HANDLE COVER (BLUE)

## (undated) DEVICE — DRAPE TOWEL BLUE 17" X 24"

## (undated) DEVICE — MISONIX BONESCALPEL BLUNT BLADE & TUBESET 20MM